# Patient Record
Sex: MALE | Race: BLACK OR AFRICAN AMERICAN | Employment: OTHER | URBAN - METROPOLITAN AREA
[De-identification: names, ages, dates, MRNs, and addresses within clinical notes are randomized per-mention and may not be internally consistent; named-entity substitution may affect disease eponyms.]

---

## 2018-10-29 ENCOUNTER — OFFICE VISIT (OUTPATIENT)
Dept: ORTHOPEDIC SURGERY | Age: 58
End: 2018-10-29

## 2018-10-29 VITALS
WEIGHT: 256.8 LBS | OXYGEN SATURATION: 98 % | DIASTOLIC BLOOD PRESSURE: 78 MMHG | TEMPERATURE: 98.2 F | HEART RATE: 74 BPM | RESPIRATION RATE: 16 BRPM | SYSTOLIC BLOOD PRESSURE: 125 MMHG | HEIGHT: 70 IN | BODY MASS INDEX: 36.76 KG/M2

## 2018-10-29 DIAGNOSIS — M76.61 ACHILLES TENDINITIS OF RIGHT LOWER EXTREMITY: Primary | ICD-10-CM

## 2018-10-29 PROBLEM — E66.01 SEVERE OBESITY (HCC): Status: ACTIVE | Noted: 2018-10-29

## 2018-10-29 RX ORDER — MELOXICAM 15 MG/1
15 TABLET ORAL
Qty: 30 TAB | Refills: 0 | Status: SHIPPED | OUTPATIENT
Start: 2018-10-29 | End: 2018-11-27

## 2018-10-29 RX ORDER — FAMOTIDINE 40 MG/1
40 TABLET, FILM COATED ORAL DAILY
Qty: 30 TAB | Refills: 0 | Status: SHIPPED | OUTPATIENT
Start: 2018-10-29 | End: 2021-10-07

## 2018-10-29 RX ORDER — DICLOFENAC SODIUM 75 MG/1
TABLET, DELAYED RELEASE ORAL
COMMUNITY
End: 2018-10-29

## 2018-10-29 NOTE — PROGRESS NOTES
AMBULATORY PROGRESS NOTE Patient: Laya Miller             MRN: [id number]     SSN: [id number] Body mass index is 36.85 kg/m². YOB: 1960     AGE: 59 y.o. EX: male PCP: No primary care provider on file. IMPRESSION/DIAGNOSIS AND TREATMENT PLAN  
 
DIAGNOSES 1. Achilles tendinitis of right lower extremity Orders Placed This Encounter  AMB SUPPLY ORDER  
 meloxicam (MOBIC) 15 mg tablet  famotidine (PEPCID) 40 mg tablet Laya Miller understands his diagnoses and the proposed plan. Plan: 
 
1) DME Order: Short right CAM walker boot with wedge. 2) ZACH Alves ATC, has educated the patient on Achilles tendon specific exercises and/or stretches. 3) Mobic 15 m PO every day; 30 tablets, 0 refills. 4) Pepcid 40 m PO every day; 30 tablets, 0 refills. RTO - 3 weeks HPI AND EXAMINATION Laya Miller IS A 59 y.o. male who presents to my outpatient office complaining of right foot pain. Mr. Elias Suazo reports that he has been experiencing pain in his right heel for 2 months. He reports that his pain does not improve when he does not work for given periods of time. He states that his pain is mostly present when he walks, and that he does not experience any pain when seated. He notes that he has to walk with his foot outwards in order to avoid pain. Mr. Elias Suazo notes that at times, his pain can reach a 10. He notes that he had xrays taken of his right foot at [facility] on 10/1. The patient denies any recent falls, twists, or trauma. He denies h/o fluoroquinolone exposure. The patient does note, however, that he works at [employer] and that he walks for prolonged periods of time. He states that he wears steel toed shoes. The patient denies any GI issues or bariatric procedures. The patient is not currently taking blood thinners.   
 
She had x-rays on a CD/DVD from [facility].  I did review these x-rays, two views of the right heel. These films show a very large, calcific bone spur at the insertion point of the Achilles tendon. Otherwise, I see no osteolytic or osteoblastic lesions on these two views. These x-rays were done on October 1, 2018, two views of the right heel, which I did review on a CD/DVD. Visit Vitals /78 Pulse 74 Temp 98.2 °F (36.8 °C) (Oral) Resp 16 Ht 5' 10\" (1.778 m) Wt 256 lb 12.8 oz (116.5 kg) SpO2 98% BMI 36.85 kg/m² Appearance: Alert, well appearing and pleasant patient who is in no distress, oriented to person, place/time, and who follows commands. This patient is accompanied in the examination room by his  self. no dementia Psychiatric: Affect and mood are appropriate. Patient arrives to office via: without assistive device:   
H EENT (2): Head normocephalic & atraumatic. Both pupils are round, non icteric sclera Eye: EOM are intact and sclera are clear Neck: ROM WNL Hearings Intact, does not require hearing aid device Respiratory: Breathing is unlabored without accessory chest muscle use Cardiovascular/Peripheral Vascular: Normal Pulses to each  foot ANKLE/FOOT right Psychiatry: Alert, Oriented x 3; Speech normal in context and clarity,  
         Memory intact grossly, no involuntary movements - tremors, no dementia Gait: Normal 
Tenderness: Mild tenderness at the insertional Achilles tendon. Cutaneous: WNL. Joint Motion: Full ROM. Joint / Tendon Stability: No Ankle or Subtalar instability or joint laxity. No peroneal sublux ability or dislocation Alignment: Forefoot, Midfoot, Hindfoot WNL. Contractures: No Gastrocnemius or Achilles contracture Neuro Motor/Sensory: NL/NL. Vascular: NL foot/ankle pulses. Lymphatics: No extremity lymphedema, No calf swelling, no tenderness to calf muscles. CHART REVIEW History reviewed. No pertinent past medical history. Current Outpatient Medications Medication Sig  
 meloxicam (MOBIC) 15 mg tablet Take 1 Tab by mouth daily (with breakfast).  famotidine (PEPCID) 40 mg tablet Take 1 Tab by mouth daily. No current facility-administered medications for this visit. No Known Allergies History reviewed. No pertinent surgical history. Social History Occupational History  Not on file Tobacco Use  Smoking status: Former Smoker Last attempt to quit: 10/29/1989 Years since quittin.0  Smokeless tobacco: Never Used Substance and Sexual Activity  Alcohol use: Not on file  Drug use: Not on file  Sexual activity: Not on file Family History Problem Relation Age of Onset  Hypertension Mother  Hypertension Father  Diabetes Father REVIEW OF SYSTEMS : 10/29/2018  ALL BELOW ARE Negative except : SEE HPI  
 
CONSTITUTIONAL: denies chills, fatigue, fever, weight change PSYCH: denies anxiety, depression, irritability or mood swings ENT: denies - headaches, hearing change, nasal congestion, oral lesions, or sore throat HEM/ONC denies - bleeding problems, bruising, pallor or swollen lymph nodes ENDO: denies hot flashes, polydipsia/polyuria or temperature intolerance RESP: denies - cough, shortness of breath or wheezing CV: denies - chest pain, edema or palpitations, MATTHEWS 
GI: denies - abdominal pain, change in bowel habits, constipation, diarrhea or nausea/vomiting : denies - dysuria, hematuria, incontinence, pelvic pain MSK: denies  - See HPI. NEURO: denies - confusion, headaches, seizures or weakness DERM: denies - dry skin, hair changes, rash or skin lesion changes VASCULAR: Peripheral Vascular: No calf pain, vascular vein tenderness to calf pain No calf throbbing, posterior knee throbbing pain DIAGNOSTIC IMAGING See above Please see above section of this report. I have personally reviewed the results of the above study.  The interpretation of this study is my professional opinion. Written by Kvng Barahona, as dictated by Dr. Branden Araujo. I, Dr. Branden Araujo, confirm that all documentation is accurate.

## 2018-10-29 NOTE — PATIENT INSTRUCTIONS
Please follow up in 3 weeks. You are advised to contact us if your condition worsens.    
 
 
Achilles Tendinitis: Exercises Your Care Instructions Here are stretching and strengthening exercises for your achilles tendon. Start each exercise slowly. Ease off the exercise if you start to have pain. Your doctor or  will tell you when you can start these exercises and which ones will work best for you. Toe stretch 1. Sit in a chair, and extend your affected leg so that your heel is on the floor. 2. With your hand, reach down and pull your big toe up and back. Pull toward your ankle and away from the floor. 3. Hold the position for at least 20 to 30 seconds. 4. Repeat 3 times a session, up to 5 sessions a day. Towel stretch (calf) 1. Sit with your legs extended and knees straight. 2. Place a towel around your foot just under the toes. 3. Hold each end of the towel in each hand, with your hands above your knees. 4. Pull back with the towel so that your foot stretches toward you. 5. Hold the position for at least 20 to 30 seconds. 6. Repeat 3 times a session, up to 5 sessions a day. Floor stretch (calf) 1. Stand about 2 feet from a wall, and place your hands on the wall at about shoulder height. Or you can stand behind a chair, placing your hands on the back of it for balance. 2. Step back with the leg you want to stretch. Keep the leg straight, and press your heel into the floor with your toe turned slightly in. 
3. Lean forward, and bend your other leg slightly. Feel the stretch in the Achilles tendon of your back leg. Hold for at least 15 to 30 seconds. Repeat with back knee slightly bent. 4. Repeat 3 times a session, up to 5 sessions a day. Stair stretch 1. Stand with the balls of both feet on the edge of a step or curb. With at least one hand, hold onto something solid for balance, such as a banister or handrail. 2. Keeping your affected leg straight, slowly let that heel hang down off of the step or curb until you feel a stretch in the back of your calf and/or Achilles area. Some of your weight should still be on the other leg. 3. Hold this position for at least 20 to 30 seconds. 4. Repeat 3 times a session, up to 5 times a day or whenever your Achilles tendon starts to feel tight. This stretch should also be repeated with your knee slightly bent. If too easy, progress to one leg at a time. Heel raises (eccentric emphasis) 1. Stand on a step with your heel off the edge of the step. Hold on to a handrail or wall for balance. 2. Push up on your toes, then slowly count to 10 as you lower yourself back down until your heel is below the step. If it hurts to push up on your toes, try putting most of your weight on your other foot as you push up, or try using your arms to help you. If you can't do this exercise without causing pain, stop the exercise and talk to your doctor. 3. Repeat the exercise 10 times. Repeat exercise with the knee slightly bent. If too easy, progress to one leg at a time. Ice Massage (pain relief) Fill small paper cup with water and freeze until needed. When frozen and ready for use, tear off top half inch of cup, exposing the ice. Place ice directly on your heel cord (or wherever you have pain). Move the ice in a circular motion for up to 5 minutes (no more). Use towels to catch the water drippings. You should feel 4 stages of sensations starting with. .. 1. Uncomfortable sensation of cold, then 2. Stinging, then 3. Burning or aching feeling, then 4. Numbness If the pain is too great to handle, lift it off your skin for a few seconds, dab with towel and then place it back on for a few circular motions and repeat.  
 
**Do not perform for more than 5 minutes or you may run the risk of frost bite and cause death to the tissue.

## 2018-11-27 ENCOUNTER — OFFICE VISIT (OUTPATIENT)
Dept: ORTHOPEDIC SURGERY | Age: 58
End: 2018-11-27

## 2018-11-27 VITALS
HEART RATE: 63 BPM | SYSTOLIC BLOOD PRESSURE: 128 MMHG | WEIGHT: 258.2 LBS | OXYGEN SATURATION: 96 % | RESPIRATION RATE: 16 BRPM | BODY MASS INDEX: 36.97 KG/M2 | HEIGHT: 70 IN | TEMPERATURE: 97.6 F | DIASTOLIC BLOOD PRESSURE: 86 MMHG

## 2018-11-27 DIAGNOSIS — M76.61 ACHILLES TENDINITIS OF RIGHT LOWER EXTREMITY: Primary | ICD-10-CM

## 2018-11-27 RX ORDER — ETODOLAC 400 MG/1
400 TABLET, FILM COATED ORAL 2 TIMES DAILY WITH MEALS
Qty: 60 TAB | Refills: 0 | Status: SHIPPED | OUTPATIENT
Start: 2018-11-27 | End: 2021-10-07

## 2018-11-27 RX ORDER — FAMOTIDINE 40 MG/1
40 TABLET, FILM COATED ORAL DAILY
Qty: 30 TAB | Refills: 0 | Status: SHIPPED | OUTPATIENT
Start: 2018-11-27 | End: 2021-10-07

## 2018-11-27 NOTE — PATIENT INSTRUCTIONS
Please follow up on December 26th. You are advised to contact us if your condition worsens. Achilles Tendon: Exercises Your Care Instructions Here are some examples of exercises for your achilles tendon. Start each exercise slowly. Ease off the exercise if you start to have pain. Your doctor or physical therapist will tell you when you can start these exercises and which ones will work best for you. Toe stretch Toe stretch 1. Sit in a chair, and extend your affected leg so that your heel is on the floor. 2. With your hand, reach down and pull your big toe up and back. Pull toward your ankle and away from the floor. 3. Hold the position for at least 15 to 30 seconds. 4. Repeat 2 to 4 times a session, several times a day. Calf-plantar fascia stretch 1. Sit with your legs extended and knees straight. 2. Place a towel around your foot just under the toes. 3. Hold each end of the towel in each hand, with your hands above your knees. 4. Pull back with the towel so that your foot stretches toward you. 5. Hold the position for at least 15 to 30 seconds. 6. Repeat 2 to 4 times a session, up to 5 sessions a day. Floor stretch 1. Stand about 2 feet from a wall, and place your hands on the wall at about shoulder height. Or you can stand behind a chair, placing your hands on the back of it for balance. 2. Step back with the leg you want to stretch. Keep the leg straight, and press your heel into the floor with your toe turned slightly in. 
3. Lean forward, and bend your other leg slightly. Feel the stretch in the Achilles tendon of your back leg. Hold for at least 15 to 30 seconds. 4. Repeat 2 to 4 times a session, up to 5 sessions a day. Stair stretch 1. Stand with the balls of both feet on the edge of a step or curb (or a medium-sized phone book). With at least one hand, hold onto something solid for balance, such as a banister or handrail. 2. Keeping your affected leg straight, slowly let that heel hang down off of the step or curb until you feel a stretch in the back of your calf and/or Achilles area. Some of your weight should still be on the other leg. 3. Hold this position for at least 15 to 30 seconds. 4. Repeat 2 to 4 times a session, up to 5 times a day or whenever your Achilles tendon starts to feel tight. This stretch can also be done with your knee slightly bent. Strength exercise 1. This exercise will get you started on building strength after an Achilles tendon injury. Your doctor or physical therapist can help you move on to more challenging exercises as you heal and get stronger. 2. Stand on a step with your heel off the edge of the step. Hold on to a handrail or wall for balance. 3. Push up on your toes, then slowly count to 10 as you lower yourself back down until your heel is below the step. If it hurts to push up on your toes, try putting most of your weight on your other foot as you push up, or try using your arms to help you. If you can't do this exercise without causing pain, stop the exercise and talk to your doctor. 4. Repeat the exercise 8 to 12 times, half with the knee straight and half with the knee bent. Follow-up care is a key part of your treatment and safety. Be sure to make and go to all appointments, and call your doctor if you are having problems. It's also a good idea to know your test results and keep a list of the medicines you take. Where can you learn more? Go to http://aby-josé luis.info/. Enter F259 in the search box to learn more about \"Achilles Tendon: Exercises. \" Current as of: November 29, 2017 Content Version: 11.8 © 7689-9845 [employer]. Care instructions adapted under license by [employer] (which disclaims liability or warranty for this information).  If you have questions about a medical condition or this instruction, always ask your healthcare professional. [place] [address] any warranty or liability for your use of this information.

## 2018-11-27 NOTE — PROGRESS NOTES
AMBULATORY PROGRESS NOTE Patient: Julianne Snowden             MRN: [id number]     SSN: [id number] Body mass index is 37.05 kg/m². YOB: 1960     AGE: 59 y.o. EX: male PCP: No primary care provider on file. IMPRESSION/DIAGNOSIS AND TREATMENT PLAN  
 
DIAGNOSES 1. Achilles tendinitis of right lower extremity Orders Placed This Encounter  REFERRAL TO PHYSICAL THERAPY  etodolac (LODINE) 400 mg tablet  famotidine (PEPCID) 40 mg tablet Julianne Snowden understands his diagnoses and the proposed plan. Plan: 
 
1) Lodine 400 m PO BID; 60 tablets, 0 refills. 2) Pepcid 40 m PO every day; 30 tablets, 0 refills. 3) Referral to Physical Therapy. Please use the Graston technique, gastrocnemius stretching, and Achilles tendon stretching. 4) Anticipate MRI if condition does not improve. RTO -  HPI AND EXAMINATION Julianne Snowden IS A 59 y.o. male who presents to my outpatient office for follow up of Achilles tendinitis of the RLE. At the last visit, I provided an order for a short right CAM walker boot with a wedge, prescribed Mobic 15 mg and Pepcid 40 mg, and provided an HEP with Achilles tendon specific exercises. Since we saw him last, Mr. Erasto Corcoran states that he is still experiencing daily pain in his right Achilles tendon. At its worse, he rates the pain at an 8. He is still working, where he has to wear working boots. The patient denies any GI issues or bariatric procedures. He has tried Voltaren, but notes that the pain relief does not last.  At this time, he will switch to Lodine 400 mg and will begin formal PT. Visit Vitals /86 Pulse 63 Temp 97.6 °F (36.4 °C) (Oral) Resp 16 Ht 5' 10\" (1.778 m) Wt 258 lb 3.2 oz (117.1 kg) SpO2 96% BMI 37.05 kg/m² Appearance: Alert, well appearing and pleasant patient who is in no distress, oriented to person, place/time, and who follows commands. This patient is accompanied in the examination room by his  self. no dementia Psychiatric: Affect and mood are appropriate. Patient arrives to office via: without assistive device:   
H EENT (2): Head normocephalic & atraumatic. Both pupils are round, non icteric sclera Eye: EOM are intact and sclera are clear Neck: ROM WNL Hearings Intact, does not require hearing aid device Respiratory: Breathing is unlabored without accessory chest muscle use Cardiovascular/Peripheral Vascular: Normal Pulses to each  foot ANKLE/FOOT right 
  
Psychiatry: Alert, Oriented x 3; Speech normal in context and clarity,  
                    Memory intact grossly, no involuntary movements - tremors, no dementia Gait: Normal 
Tenderness: Mild tenderness at the insertional Achilles tendon. Cutaneous: WNL. Joint Motion: Full ROM. Joint / Tendon Stability: No Ankle or Subtalar instability or joint laxity. No peroneal sublux ability or dislocation Alignment: Forefoot, Midfoot, Hindfoot WNL. Contractures: No Gastrocnemius or Achilles contracture Neuro Motor/Sensory: NL/NL. Vascular: NL foot/ankle pulses. Lymphatics: No extremity lymphedema, No calf swelling, no tenderness to calf muscles. CHART REVIEW No past medical history on file. Current Outpatient Medications Medication Sig  etodolac (LODINE) 400 mg tablet Take 400 mg by mouth two (2) times daily (with meals).  famotidine (PEPCID) 40 mg tablet Take 1 Tab by mouth daily.  famotidine (PEPCID) 40 mg tablet Take 1 Tab by mouth daily. No current facility-administered medications for this visit. No Known Allergies No past surgical history on file. Social History Occupational History  Not on file Tobacco Use  Smoking status: Former Smoker Last attempt to quit: 10/29/1989 Years since quittin.0  Smokeless tobacco: Never Used Substance and Sexual Activity  Alcohol use: Not on file  Drug use: Not on file  Sexual activity: Not on file Family History Problem Relation Age of Onset  Hypertension Mother  Hypertension Father  Diabetes Father REVIEW OF SYSTEMS : 11/27/2018  ALL BELOW ARE Negative except : SEE HPI  
 
CONSTITUTIONAL: denies chills, fatigue, fever, weight change PSYCH: denies anxiety, depression, irritability or mood swings ENT: denies - headaches, hearing change, nasal congestion, oral lesions, or sore throat HEM/ONC denies - bleeding problems, bruising, pallor or swollen lymph nodes ENDO: denies hot flashes, polydipsia/polyuria or temperature intolerance RESP: denies - cough, shortness of breath or wheezing CV: denies - chest pain, edema or palpitations, MATTHEWS 
GI: denies - abdominal pain, change in bowel habits, constipation, diarrhea or nausea/vomiting : denies - dysuria, hematuria, incontinence, pelvic pain MSK: denies  - See HPI. NEURO: denies - confusion, headaches, seizures or weakness DERM: denies - dry skin, hair changes, rash or skin lesion changes VASCULAR: Peripheral Vascular: No calf pain, vascular vein tenderness to calf pain No calf throbbing, posterior knee throbbing pain DIAGNOSTIC IMAGING No notes on file Please see above section of this report. I have personally reviewed the results of the above study. The interpretation of this study is my professional opinion. Written by Meño Lua, as dictated by Dr. Cortney Ortiz. I, Dr. Cortney Ortiz, confirm that all documentation is accurate.

## 2018-11-29 ENCOUNTER — TELEPHONE (OUTPATIENT)
Dept: ORTHOPEDIC SURGERY | Age: 58
End: 2018-11-29

## 2018-11-29 NOTE — TELEPHONE ENCOUNTER
. Tamera Bryant identified himself on his voice mail. I left a detailed message asking if he would like to attend physical therapy at [facility] or [facility]. Thank you.

## 2018-12-24 ENCOUNTER — OFFICE VISIT (OUTPATIENT)
Dept: ORTHOPEDIC SURGERY | Age: 58
End: 2018-12-24

## 2018-12-24 VITALS
HEART RATE: 68 BPM | WEIGHT: 259 LBS | SYSTOLIC BLOOD PRESSURE: 143 MMHG | TEMPERATURE: 95.5 F | DIASTOLIC BLOOD PRESSURE: 86 MMHG | BODY MASS INDEX: 37.08 KG/M2 | HEIGHT: 70 IN | OXYGEN SATURATION: 97 % | RESPIRATION RATE: 16 BRPM

## 2018-12-24 DIAGNOSIS — M76.61 ACHILLES TENDINITIS OF RIGHT LOWER EXTREMITY: Primary | ICD-10-CM

## 2018-12-24 RX ORDER — ETODOLAC 400 MG/1
400 TABLET, FILM COATED ORAL 2 TIMES DAILY WITH MEALS
Qty: 60 TAB | Refills: 1 | Status: SHIPPED | OUTPATIENT
Start: 2018-12-24 | End: 2021-10-07

## 2018-12-24 RX ORDER — FAMOTIDINE 40 MG/1
40 TABLET, FILM COATED ORAL DAILY
Qty: 30 TAB | Refills: 1 | Status: SHIPPED | OUTPATIENT
Start: 2018-12-24 | End: 2021-10-07

## 2018-12-24 NOTE — PATIENT INSTRUCTIONS
Please follow up in 6 weeks. You are advised to contact us if your condition worsens. Achilles Tendon: Exercises  Your Care Instructions  Here are some examples of exercises for your achilles tendon. Start each exercise slowly. Ease off the exercise if you start to have pain. Your doctor or physical therapist will tell you when you can start these exercises and which ones will work best for you. Toe stretch  Toe stretch    1. Sit in a chair, and extend your affected leg so that your heel is on the floor. 2. With your hand, reach down and pull your big toe up and back. Pull toward your ankle and away from the floor. 3. Hold the position for at least 15 to 30 seconds. 4. Repeat 2 to 4 times a session, several times a day. Calf-plantar fascia stretch    1. Sit with your legs extended and knees straight. 2. Place a towel around your foot just under the toes. 3. Hold each end of the towel in each hand, with your hands above your knees. 4. Pull back with the towel so that your foot stretches toward you. 5. Hold the position for at least 15 to 30 seconds. 6. Repeat 2 to 4 times a session, up to 5 sessions a day. Floor stretch    1. Stand about 2 feet from a wall, and place your hands on the wall at about shoulder height. Or you can stand behind a chair, placing your hands on the back of it for balance. 2. Step back with the leg you want to stretch. Keep the leg straight, and press your heel into the floor with your toe turned slightly in.  3. Lean forward, and bend your other leg slightly. Feel the stretch in the Achilles tendon of your back leg. Hold for at least 15 to 30 seconds. 4. Repeat 2 to 4 times a session, up to 5 sessions a day. Stair stretch    1. Stand with the balls of both feet on the edge of a step or curb (or a medium-sized phone book). With at least one hand, hold onto something solid for balance, such as a banister or handrail.   2. Keeping your affected leg straight, slowly let that heel hang down off of the step or curb until you feel a stretch in the back of your calf and/or Achilles area. Some of your weight should still be on the other leg. 3. Hold this position for at least 15 to 30 seconds. 4. Repeat 2 to 4 times a session, up to 5 times a day or whenever your Achilles tendon starts to feel tight. This stretch can also be done with your knee slightly bent. Strength exercise    1. This exercise will get you started on building strength after an Achilles tendon injury. Your doctor or physical therapist can help you move on to more challenging exercises as you heal and get stronger. 2. Stand on a step with your heel off the edge of the step. Hold on to a handrail or wall for balance. 3. Push up on your toes, then slowly count to 10 as you lower yourself back down until your heel is below the step. If it hurts to push up on your toes, try putting most of your weight on your other foot as you push up, or try using your arms to help you. If you can't do this exercise without causing pain, stop the exercise and talk to your doctor. 4. Repeat the exercise 8 to 12 times, half with the knee straight and half with the knee bent. Follow-up care is a key part of your treatment and safety. Be sure to make and go to all appointments, and call your doctor if you are having problems. It's also a good idea to know your test results and keep a list of the medicines you take. Where can you learn more? Go to http://aby-josé luis.info/. Enter B532 in the search box to learn more about \"Achilles Tendon: Exercises. \"  Current as of: November 29, 2017  Content Version: 11.8  © 7041-8054 [employer]. Care instructions adapted under license by [employer] (which disclaims liability or warranty for this information).  If you have questions about a medical condition or this instruction, always ask your healthcare professional. Lyssacarina Cooper disclaims any warranty or liability for your use of this information.

## 2018-12-24 NOTE — PROGRESS NOTES
AMBULATORY PROGRESS NOTE      Patient: Ariel Jones             MRN: [id number]     SSN: [id number] Body mass index is 37.16 kg/m². YOB: 1960     AGE: 59 y.o. EX: male    PCP: No primary care provider on file. IMPRESSION/DIAGNOSIS AND TREATMENT PLAN     DIAGNOSES  1. Achilles tendinitis of right lower extremity        Orders Placed This Encounter    etodolac (LODINE) 400 mg tablet    famotidine (PEPCID) 40 mg tablet      Ariel Jones understands his diagnoses and the proposed plan. Plan:    1) Renew Lodine 400 m PO BID; 60 tablets, 0 refills. 2) Renew Pepcid 40 m PO every day; 30 tablets, 0 refills. 3) Continue HEP with Achilles tendon exercises and stretches. 4) Continue activity as tolerated. RTO - 6 weeks     HPI AND EXAMINATION     Ariel Jones IS A 59 y.o. male who presents to my outpatient office for follow up of Achilles tendinitis of the RLE. At the last visit, I prescribed Lodine 400 mg and Pepcid 40 mg, provided a referral to PT, and instructed the patient to continue activity as tolerated. Since we saw him last, Mr. Barbara Berumen states that he is still experiencing daily 7 out of 10 pain in his right Achilles tendon. He states that he has been taking the Lodine as directed, and notes that it has been helping with his pain. He denies any GI issues with this medication. He notes that his ROM has improved, as well. Visit Vitals  /86   Pulse 68   Temp 95.5 °F (35.3 °C) (Oral)   Resp 16   Ht 5' 10\" (1.778 m)   Wt 259 lb (117.5 kg)   SpO2 97%   BMI 37.16 kg/m²      Appearance: Alert, well appearing and pleasant patient who is in no distress, oriented to person, place/time, and who follows commands. This patient is accompanied in the examination room by his  self. no dementia  Psychiatric: Affect and mood are appropriate. Patient arrives to office via: without assistive device:    H EENT (2): Head normocephalic & atraumatic.                         YGSU pupils are round, non icteric sclera              Eye: EOM are intact and sclera are clear               Neck: ROM WNL                Hearings Intact, does not require hearing aid device  Respiratory: Breathing is unlabored without accessory chest muscle use  Cardiovascular/Peripheral Vascular: Normal Pulses to each  foot     ANKLE/FOOT right     Psychiatry: Alert, Oriented x 3; Speech normal in context and clarity,                       Memory intact grossly, no involuntary movements - tremors, no dementia  Gait: Normal  Tenderness: Mild tenderness at the insertional Achilles tendon.   Cutaneous: WNL. Joint Motion: Full ROM. Joint / Tendon Stability: No Ankle or Subtalar instability or joint laxity.                                           No peroneal sublux ability or dislocation  Alignment: Forefoot, Midfoot, Hindfoot WNL.    Contractures: No Gastrocnemius or Achilles contracture  Neuro Motor/Sensory: NL/NL. Vascular: NL foot/ankle pulses. Lymphatics: No extremity lymphedema, No calf swelling, no tenderness to calf muscles. CHART REVIEW     No past medical history on file. Current Outpatient Medications   Medication Sig    etodolac (LODINE) 400 mg tablet Take 400 mg by mouth two (2) times daily (with meals).  famotidine (PEPCID) 40 mg tablet Take 1 Tab by mouth daily.  etodolac (LODINE) 400 mg tablet Take 400 mg by mouth two (2) times daily (with meals).  famotidine (PEPCID) 40 mg tablet Take 1 Tab by mouth daily.  famotidine (PEPCID) 40 mg tablet Take 1 Tab by mouth daily. No current facility-administered medications for this visit. No Known Allergies  No past surgical history on file.   Social History     Occupational History    Not on file   Tobacco Use    Smoking status: Former Smoker     Last attempt to quit: 10/29/1989     Years since quittin.1    Smokeless tobacco: Never Used   Substance and Sexual Activity    Alcohol use: Not on file  Drug use: Not on file    Sexual activity: Not on file     Family History   Problem Relation Age of Onset    Hypertension Mother     Hypertension Father     Diabetes Father         REVIEW OF SYSTEMS : 12/24/2018  ALL BELOW ARE Negative except : SEE HPI     Constitutional: Negative for fever, chills and weight loss. Neg Weight Loss  Cardiovascular: Negative for chest pain, claudication and leg swelling. SOB, MATTHEWS   Gastrointestinal/Urological: Negative for  pain, N/V/D/C, Blood in stool or urine,dysuria                         Hematuria, Incontinence, pelvic pain  Musculoskeletal: see HPI. Neurological: Negative for dizziness and weakness, headaches,Visual Changes             Confusion,  Or Seizures,   Psychiatric/Behavioral: Negative for depression, memory loss and substance abuse. Extremities:  Negative for hair changes, rash or skin lesion changes. Hematologic: Negative for Bleeding problems, bruising, pallor or swollen lymph nodes. Peripheral Vascular: No calf pain, vascular vein tenderness to calf pain              No calf throbbing, posterior knee throbbing pain     DIAGNOSTIC IMAGING     No notes on file    Please see above section of this report. I have personally reviewed the results of the above study. The interpretation of this study is my professional opinion. Written by Todd Edmond, as dictated by Dr. Edvin Mesa. I, Dr. Edvin Mesa, confirm that all documentation is accurate.

## 2021-10-07 ENCOUNTER — OFFICE VISIT (OUTPATIENT)
Dept: INTERNAL MEDICINE CLINIC | Age: 61
End: 2021-10-07
Payer: COMMERCIAL

## 2021-10-07 VITALS
HEIGHT: 70 IN | RESPIRATION RATE: 20 BRPM | OXYGEN SATURATION: 98 % | WEIGHT: 248.2 LBS | SYSTOLIC BLOOD PRESSURE: 135 MMHG | DIASTOLIC BLOOD PRESSURE: 76 MMHG | TEMPERATURE: 97.9 F | HEART RATE: 69 BPM | BODY MASS INDEX: 35.53 KG/M2

## 2021-10-07 DIAGNOSIS — Z00.00 ANNUAL PHYSICAL EXAM: ICD-10-CM

## 2021-10-07 DIAGNOSIS — E66.01 SEVERE OBESITY (HCC): ICD-10-CM

## 2021-10-07 DIAGNOSIS — Z00.00 ENCOUNTER FOR SCREENING AND PREVENTATIVE CARE: Primary | ICD-10-CM

## 2021-10-07 DIAGNOSIS — R68.82 DECREASED LIBIDO: ICD-10-CM

## 2021-10-07 PROCEDURE — 99386 PREV VISIT NEW AGE 40-64: CPT | Performed by: INTERNAL MEDICINE

## 2021-10-07 PROCEDURE — 99203 OFFICE O/P NEW LOW 30 MIN: CPT | Performed by: INTERNAL MEDICINE

## 2021-10-07 NOTE — PROGRESS NOTES
*ATTENTION:  This note has been created by a medical student for educational purposes only. Please do not refer to the content of this note for clinical decision-making, billing, or other purposes. Please see attending physicians note to obtain clinical information on this patient. *       1. Encounter for screening and preventative care  - REFERRAL TO GASTROENTEROLOGY for routine colonoscopy  - Patient plans to get flu vaccine at pharmacy, advised him to get shingles and tetanus vaccine as well  - Advised patient to see a dentist    2. Annual physical exam  Performed PE  - METABOLIC PANEL, COMPREHENSIVE  - LIPID PANEL  - TSH 3RD GENERATION  - PSA SCREENING (SCREENING)  - HEMOGLOBIN A1C WITH EAG    3. Decreased libido  Not associated with any other symptoms of low testosterone or conflict with spouse  - TESTOSTERONE, FREE & TOTAL  - Recommended behavioral changes    4. Severe obesity ([facility] [place] [address]  Patient eats calorie dense food, 2 meals a day with snacks, but avoids sugar-containing beverages. Provided dietary counseling, including less calorie dense fruits and more veggies. He checks his weight every day      Chief Complaint   Patient presents with   [facility] Patient        HPI   Mr. Michel Mckenzie is a 56year old male who comes to clinic at the request of his wife for routine health screening. He incidentally reports decreased libido, mild right-hand pain that is relieved with Advil, and rare intermittent self-limited lightheadedness that does not interfere with ADLs. He is also concerned about his diet. Patient Active Problem List   Diagnosis Code    Severe obesity ([facility] [place] [address] E66.01        No current outpatient medications on file prior to visit. No current facility-administered medications on file prior to visit.         ROS  - GEN: No chills  - HEENT: Does not see dentist  - CV: No chest pain  - RESP: No SOB  - ABD: No belly pain  - : decreased libido  - Neuro: very rare episodes of self-limited lightheadedness  - MS: mild intermittent right hand pain      Visit Vitals  /76   Pulse 69   Temp 97.9 °F (36.6 °C)   Resp 20   Ht 5' 10\" (1.778 m)   Wt 248 lb 3.2 oz (112.6 kg)   SpO2 98%   BMI 35.61 kg/m²        Physical Exam   Physical Exam  Constitutional:       Appearance: Well-appearing, no acute distress  HENT:      Head: Normocephalic. Nose: No gross deformity     Mouth: Mucous membranes are moist, dental carries  Eyes:      Extraocular Movements: Extraocular movements intact. Conjunctiva/sclera: anicteric     Pupils: Pupils are equal, round, and reactive to light. Cardiovascular:      Rate and Rhythm: regular rate and rhythm, no m/r/g     Pulses: 2+ radial pulses  Pulmonary:      Effort: No increased work of breathing     Breath sounds: CTAB  Abdominal:      General: NT, ND  Neurological:      Mental Status: patient is alert and grossly oriented.      No resting tremor, dysarthria, gait abnormality  Musculoskeletal:     Grossly full ROM in extremities  Skin:     Warm and dry  :     Deferred  Psych:     Appropriate mood and affect

## 2021-10-07 NOTE — PROGRESS NOTES
1. Encounter for screening and preventative care  Referral to GI for screening colonoscopy  - REFERRAL TO GASTROENTEROLOGY    2. Annual physical exam  Annual physical exam was performed  - METABOLIC PANEL, COMPREHENSIVE  - LIPID PANEL  - TSH 3RD GENERATION  - PSA SCREENING (SCREENING)  - HEMOGLOBIN A1C WITH EAG    3. Decreased libido  Patient's decreased libido could be caused from deficient testosterone. This is a new problem. I am to check a total and free testosterone level  - TESTOSTERONE, FREE & TOTAL    4. Severe obesity ([address]  Is a new problem. The patient and I had a very good conversations about weight loss. We have set a goal of at least 20 pounds over the next 3 months. I am also checking his TSH given that an underactive thyroid could be causing this      Chief Complaint   Patient presents with    New Patient        HPI   This is a delightful 56-year-old gentleman who presents as a new patient and for his annual physical.  He reports overall he has been doing well although he admits his weight is been a problem. He has been using his exercise machine at home and has actually lost a little bit of weight ever since he retired. He has noticed decreased libido. He denies any fatigue weakness or daytime somnolence. He has been having any fevers or chills and otherwise reports he is doing great. He has never had a colonoscopy. Patient Active Problem List   Diagnosis Code    Severe obesity ([facility] [address] E66.01        No current outpatient medications on file prior to visit. No current facility-administered medications on file prior to visit. ROS  - GEN: + weight loss, no fevers or chills + decreased libido  - HEENT: no vision changes, no tinnitus, no sore throat  - CV: no cp, palpitations or edema  - RESP: no sob, cough  - ABD: no n/v/d, no blood in stool  - : no dysuria or changes in freq.   - SKIN: no rashes, ulcers  - Neuro: no resting tremors, parasthesia in extremities, no headaches  - MS: No weakness in extremities, no gait abnormalities  - Psych: negative for depression or anxiety      Visit Vitals  /76   Pulse 69   Temp 97.9 °F (36.6 °C)   Resp 20   Ht 5' 10\" (1.778 m)   Wt 248 lb 3.2 oz (112.6 kg)   SpO2 98%   BMI 35.61 kg/m²           Physical Exam  Constitutional:       Appearance: Normal appearance. Morbidly obese. NAD and pleasant  HENT:      Head: Normocephalic. Nose: Nose normal.      Mouth/Throat:      Mouth: Mucous membranes are moist. Throat not inflammed  Eyes:      Extraocular Movements: Extraocular movements intact. Conjunctiva/sclera: Conjunctivae normal. Sclera anicteric     Pupils: Pupils are equal, round, and reactive to light. Cardiovascular:      Rate and Rhythm: Normal rate and regular rhythm. Pulses: Normal pulses. Pulmonary:      Effort: No respiratory distress. Breath sounds: CTAB and No stridor. No rhonchi. Abdominal:      General: There is no distension. NT, ND  Neurological:      Mental Status: patient is alert and oriented times 3.  No resting tremor, normal gait     Cranial Nerves: cranial nerves grossly intact  Muskuloskeletal     Full ROM in extremities     Normal gait  Skin     Dry without lesions on examined areas, warm to the touch       Deferred  Psychiatry     Calm, normal affect, interacting normally

## 2021-10-07 NOTE — PROGRESS NOTES
Amelia Randhawa presents today for   Chief Complaint   Patient presents with    New Patient       Is someone accompanying this pt? no  Is the patient using any DME equipment during OV? no    Depression Screening:  3 most recent PHQ Screens 10/7/2021   Little interest or pleasure in doing things Not at all   Feeling down, depressed, irritable, or hopeless Not at all   Total Score PHQ 2 0       Learning Assessment:  No flowsheet data found. Fall Risk  No flowsheet data found. ADL  ADL Assessment 10/7/2021   Feeding yourself No Help Needed   Getting from bed to chair No Help Needed   Getting dressed No Help Needed   Bathing or showering No Help Needed   Walk across the room (includes cane/walker) No Help Needed   Using the telphone No Help Needed   Taking your medications No Help Needed   Preparing meals No Help Needed   Managing money (expenses/bills) No Help Needed   Moderately strenuous housework (laundry) No Help Needed   Shopping for personal items (toiletries/medicines) No Help Needed   Shopping for groceries No Help Needed   Driving No Help Needed   Climbing a flight of stairs No Help Needed   Getting to places beyond walking distances No Help Needed       Health Maintenance reviewed and discussed and ordered per Provider. Health Maintenance Due   Topic Date Due    Hepatitis C Screening  Never done    COVID-19 Vaccine (1) Never done    DTaP/Tdap/Td series (1 - Tdap) Never done    Lipid Screen  Never done    Colorectal Cancer Screening Combo  Never done    Shingrix Vaccine Age 50> (1 of 2) Never done    Flu Vaccine (1) Never done   . Coordination of Care:  1. \"Have you been to the ER, urgent care clinic since your last visit? Hospitalized since your last visit? \" No    2. \"Have you seen or consulted any other health care providers outside of the [address] [place] [address] since your last visit? \" No     3. For patients aged 39-70: Has the patient had a colonoscopy?  No NEVER HAD A COLONOSCOPY

## 2021-10-08 ENCOUNTER — HOSPITAL ENCOUNTER (OUTPATIENT)
Dept: LAB | Age: 61
Discharge: HOME | End: 2021-10-08
Payer: COMMERCIAL

## 2021-10-08 LAB
ALBUMIN SERPL-MCNC: 4 G/DL (ref 3.5–4.7)
ALBUMIN/GLOB SERPL: 1.2 {RATIO}
ALP SERPL-CCNC: 55 U/L (ref 38–126)
ALT SERPL-CCNC: 18 U/L (ref 3–72)
ANION GAP SERPL CALC-SCNC: 9 MMOL/L
AST SERPL W P-5'-P-CCNC: 16 U/L (ref 17–74)
BILIRUB SERPL-MCNC: 0.8 MG/DL (ref 0.2–1)
BUN SERPL-MCNC: 11 MG/DL (ref 9–21)
BUN/CREAT SERPL: 12
CA-I BLD-MCNC: 9.1 MG/DL (ref 8.5–10.5)
CHLORIDE SERPL-SCNC: 107 MMOL/L (ref 94–111)
CHOLEST SERPL-MCNC: 209 MG/DL
CO2 SERPL-SCNC: 25 MMOL/L (ref 21–33)
CREAT SERPL-MCNC: 0.9 MG/DL (ref 0.8–1.5)
EST. AVERAGE GLUCOSE BLD GHB EST-MCNC: 114 MG/DL
GLOBULIN SER CALC-MCNC: 3.4 G/DL
GLUCOSE SERPL-MCNC: 92 MG/DL (ref 70–110)
HBA1C MFR BLD: 5.6 % (ref 4.2–5.6)
HDLC SERPL-MCNC: 67 MG/DL (ref 40–60)
HDLC SERPL: 3.1 {RATIO} (ref 0–5)
LDLC SERPL CALC-MCNC: 131.8 MG/DL (ref 0–100)
LIPID PROFILE,FLP: ABNORMAL
POTASSIUM SERPL-SCNC: 3.9 MMOL/L (ref 3.2–5.1)
PROT SERPL-MCNC: 7.4 G/DL (ref 6.1–8.4)
PSA SERPL-MCNC: 7.4 NG/ML (ref 0–4)
SODIUM SERPL-SCNC: 141 MMOL/L (ref 135–145)
TRIGL SERPL-MCNC: 51 MG/DL (ref ?–150)
TSH SERPL DL<=0.05 MIU/L-ACNC: 0.61 UIU/ML (ref 0.35–6.2)
VLDLC SERPL CALC-MCNC: 10.2 MG/DL

## 2021-10-08 PROCEDURE — 80061 LIPID PANEL: CPT

## 2021-10-08 PROCEDURE — 80053 COMPREHEN METABOLIC PANEL: CPT

## 2021-10-08 PROCEDURE — 36415 COLL VENOUS BLD VENIPUNCTURE: CPT

## 2021-10-08 PROCEDURE — 84153 ASSAY OF PSA TOTAL: CPT

## 2021-10-08 PROCEDURE — 84443 ASSAY THYROID STIM HORMONE: CPT

## 2021-10-08 PROCEDURE — 83036 HEMOGLOBIN GLYCOSYLATED A1C: CPT

## 2021-10-08 PROCEDURE — 84403 ASSAY OF TOTAL TESTOSTERONE: CPT

## 2021-10-11 ENCOUNTER — TELEPHONE (OUTPATIENT)
Dept: INTERNAL MEDICINE CLINIC | Age: 61
End: 2021-10-11

## 2021-10-11 DIAGNOSIS — R97.20 ELEVATED PSA: Primary | ICD-10-CM

## 2021-10-11 NOTE — TELEPHONE ENCOUNTER
----- Message from Hunter Pepe MD sent at 10/9/2021  2:57 PM EDT -----  His psa is significantly elevated. This doesn't mean he has prostate cancer but does warrant a urology followup. Please place a consult for urology.  Dr. Nneka Hendricks

## 2021-10-14 LAB
COMMENT, TESC2: NORMAL
TESTOST FREE SERPL-MCNC: 7.3 PG/ML (ref 6.6–18.1)
TESTOST SERPL-MCNC: 387 NG/DL (ref 264–916)

## 2021-12-20 ENCOUNTER — TELEPHONE (OUTPATIENT)
Dept: INTERNAL MEDICINE CLINIC | Age: 61
End: 2021-12-20

## 2021-12-20 NOTE — TELEPHONE ENCOUNTER
Pt called for his last PSA numbers/results. He is going to a urology appt in [place] and wants to be able to give to them.  His # is [phone]

## 2022-03-19 PROBLEM — E66.01 SEVERE OBESITY (HCC): Status: ACTIVE | Noted: 2018-10-29

## 2022-08-02 ENCOUNTER — HOSPITAL ENCOUNTER (OUTPATIENT)
Dept: LAB | Age: 62
Discharge: HOME | End: 2022-08-02
Payer: COMMERCIAL

## 2022-08-02 LAB — PSA SERPL-MCNC: 6.4 NG/ML (ref 0–4)

## 2022-08-02 PROCEDURE — 84153 ASSAY OF PSA TOTAL: CPT

## 2022-08-02 PROCEDURE — 36415 COLL VENOUS BLD VENIPUNCTURE: CPT

## 2022-08-17 ENCOUNTER — TELEPHONE (OUTPATIENT)
Dept: FAMILY MEDICINE CLINIC | Age: 62
End: 2022-08-17

## 2022-08-17 NOTE — TELEPHONE ENCOUNTER
Received message from [facility] ([facility]) that pt would like to schedule NP appt. Called pt and received VM. Left message to call me back.

## 2022-11-01 ENCOUNTER — HOSPITAL ENCOUNTER (OUTPATIENT)
Age: 62
Discharge: HOME | End: 2022-11-01
Attending: NURSE PRACTITIONER
Payer: COMMERCIAL

## 2022-11-01 VITALS — BODY MASS INDEX: 33 KG/M2 | WEIGHT: 230 LBS

## 2022-11-01 DIAGNOSIS — C61 PROSTATE CANCER (HCC): ICD-10-CM

## 2022-11-01 LAB — CREAT UR-MCNC: 1 MG/DL (ref 0.6–1.3)

## 2022-11-01 PROCEDURE — A9577 INJ MULTIHANCE: HCPCS | Performed by: NURSE PRACTITIONER

## 2022-11-01 PROCEDURE — 74011250636 HC RX REV CODE- 250/636: Performed by: NURSE PRACTITIONER

## 2022-11-01 PROCEDURE — 82565 ASSAY OF CREATININE: CPT

## 2022-11-01 PROCEDURE — 72197 MRI PELVIS W/O & W/DYE: CPT

## 2022-11-01 RX ADMIN — GADOBENATE DIMEGLUMINE 20 ML: 529 INJECTION, SOLUTION INTRAVENOUS at 15:58

## 2023-05-08 ENCOUNTER — HOSPITAL ENCOUNTER (OUTPATIENT)
Age: 63
Discharge: HOME | End: 2023-05-11
Payer: COMMERCIAL

## 2023-05-08 DIAGNOSIS — C61 MALIGNANT NEOPLASM OF PROSTATE (HCC): ICD-10-CM

## 2023-05-08 LAB — PSA SERPL-MCNC: 6.4 NG/ML (ref 0–4)

## 2023-05-08 PROCEDURE — 36415 COLL VENOUS BLD VENIPUNCTURE: CPT

## 2023-05-08 PROCEDURE — 84153 ASSAY OF PSA TOTAL: CPT

## 2023-09-08 ENCOUNTER — TELEPHONE (OUTPATIENT)
Facility: CLINIC | Age: 63
End: 2023-09-08

## 2023-09-08 NOTE — TELEPHONE ENCOUNTER
----- Message from Brentshahid Wilson sent at 9/8/2023  9:28 AM EDT -----  Subject: Appointment Request    Reason for Call: New Patient/New to Provider Appointment needed: New   Patient Request Appointment    QUESTIONS    Reason for appointment request? No appointments available during search     Additional Information for Provider? Pt would like a NP get established   appt with any doctor in this office. Pt would like to get a annual   physical during the first appt if possible. Pt will also want to discuss a   colonoscopy as a preventative procedure. Pt prefers to schedule anytime   after 9/28/23.  Please advise if any doctor can make availability and when.  ---------------------------------------------------------------------------  --------------  Cody [place] Maile  [phone]; OK to leave message on voicemail  ---------------------------------------------------------------------------  --------------  SCRIPT ANSWERS

## 2023-09-08 NOTE — TELEPHONE ENCOUNTER
----- Message from Joannelvira Brynn sent at 9/8/2023  9:30 AM EDT -----  Subject: Appointment Request    Reason for Call: New Patient/New to Provider Appointment needed: New   Patient Request Appointment    QUESTIONS    Reason for appointment request? No appointments available during search     Additional Information for Provider? Pt would like a NP get established   appt with any doctor in this office. Pt would like to get a annual   physical during the first appt if possible. Pt will also want to discuss a   colonoscopy as a preventative procedure. Pt prefers to schedule anytime   after 9/28/23.  Please advise if any doctor can make availability and when.  ---------------------------------------------------------------------------  --------------  [address]  [phone]; OK to leave message on voicemail  ---------------------------------------------------------------------------  --------------  SCRIPT ANSWERS

## 2023-09-08 NOTE — TELEPHONE ENCOUNTER
Spoke w/pt and have him scheduled for next available and advised physical will have to be scheduled as a separate visit after est care visit. He understood.

## 2023-09-14 ENCOUNTER — TELEPHONE (OUTPATIENT)
Facility: CLINIC | Age: 63
End: 2023-09-14

## 2023-09-14 NOTE — TELEPHONE ENCOUNTER
----- Message from Joannelvira Brynn sent at 9/8/2023  9:27 AM EDT -----  Subject: Appointment Request    Reason for Call: New Patient/New to Provider Appointment needed: New   Patient Request Appointment    QUESTIONS    Reason for appointment request? No appointments available during search     Additional Information for Provider? Pt would like a NP get established   appt with any doctor in this office. Pt would like to get a annual   physical during the first appt if possible. Pt will also want to discuss a   colonoscopy as a preventative procedure. Pt prefers to schedule anytime   after 9/28/23.  Please advise if any doctor can make availability and when.  ---------------------------------------------------------------------------  --------------  Cody [place] Maile  [phone]; OK to leave message on voicemail  ---------------------------------------------------------------------------  --------------  SCRIPT ANSWERS

## 2023-09-15 ENCOUNTER — TELEPHONE (OUTPATIENT)
Facility: CLINIC | Age: 63
End: 2023-09-15

## 2023-09-15 NOTE — TELEPHONE ENCOUNTER
----- Message from Ran Valencia sent at 9/15/2023  4:11 PM EDT -----  Subject: Message to Provider    QUESTIONS  Information for Provider? Patient was returning a missed call, please call   him back. ---------------------------------------------------------------------------  --------------  Kirstin GALE  [phone]; OK to leave message on voicemail  ---------------------------------------------------------------------------  --------------  SCRIPT ANSWERS  Relationship to Patient?  Self

## 2023-09-15 NOTE — TELEPHONE ENCOUNTER
Pt returning call for NP Sabina Trinidad, it looks like nursing tried calling him yesterday.     ~PCR

## 2023-11-16 NOTE — PROGRESS NOTES
Peg prep given to the patient via telephone, emailed, and mailed. Procedure 11-, Dx Code z12.11  Patient verbalized understanding.  bossman Pineda

## 2023-11-21 ENCOUNTER — TELEPHONE (OUTPATIENT)
Age: 63
End: 2023-11-21

## 2023-11-21 ENCOUNTER — SCHEDULED TELEPHONE ENCOUNTER (OUTPATIENT)
Age: 63
End: 2023-11-21

## 2023-11-21 DIAGNOSIS — Z12.11 SCREENING FOR COLON CANCER: Primary | ICD-10-CM

## 2023-11-21 RX ORDER — POLYETHYLENE GLYCOL 3350, SODIUM SULFATE ANHYDROUS, SODIUM BICARBONATE, SODIUM CHLORIDE, POTASSIUM CHLORIDE 236; 22.74; 6.74; 5.86; 2.97 G/4L; G/4L; G/4L; G/4L; G/4L
4 POWDER, FOR SOLUTION ORAL ONCE
Qty: 4000 ML | Refills: 0 | Status: SHIPPED | OUTPATIENT
Start: 2023-11-21 | End: 2023-11-21

## 2023-11-22 ENCOUNTER — PREP FOR PROCEDURE (OUTPATIENT)
Age: 63
End: 2023-11-22

## 2023-11-22 DIAGNOSIS — Z12.11 COLON CANCER SCREENING: Primary | ICD-10-CM

## 2023-11-22 RX ORDER — SODIUM CHLORIDE, SODIUM LACTATE, POTASSIUM CHLORIDE, CALCIUM CHLORIDE 600; 310; 30; 20 MG/100ML; MG/100ML; MG/100ML; MG/100ML
INJECTION, SOLUTION INTRAVENOUS CONTINUOUS
Status: CANCELLED | OUTPATIENT
Start: 2023-11-22

## 2023-11-22 NOTE — H&P
Open access updated H&P. Brief history: The patient is male [facility] and the [place] /  70 y.o. who was referred for screening colonoscopy. Review of the records showed that they had few if any health problems, excessive obesity, or significant medications that would require office evaluation prior to colonoscopy for colon cancer screening. After review of their chart, contact was made with the patient in discussion and literature sent regarding the procedure and the bowel preparation. They are here now for their procedure. Past medical and surgical history: History reviewed. No pertinent past medical history. Past Surgical History:   Procedure Laterality Date    PROSTATE BIOPSY          Allergies:  No Known Allergies     Medications:  No current facility-administered medications for this encounter. No current outpatient medications on file. Family history:  The patient has a family history of no known GI disease. Social history :    Social Connections: Not on file        ROS:   Review of Systems - negative     Vital signs:  [unfilled]     PE: Healthy appearing male  HEENT: Normocephalic atraumatic. No oral abnormalities. Lungs: Clear to auscultation percussion. Heart: Regular rate and rhythm without murmur rub or gallop. Abdomen: Normal bowel sounds, soft nontender without hepatosplenomegaly or masses. Extremities: No peripheral edema. Neuro: No distinct abnormalities. Impression:  1. Colon cancer screening. The patient is a healthy male that is stable and here for colon cancer screening via colonoscopy. Recommendations:  1. Colonoscopy with MAC. The risks, benefits, adverse reactions including death and the possibility of missed lesions were neoplasia were discussed in detail today with the patient prior to the procedure. They understand and agreed to undergo the procedure. 2.  Further recommendations pending their clinical course. 3.  Followup as needed.

## 2023-11-27 ENCOUNTER — ANESTHESIA EVENT (OUTPATIENT)
Age: 63
End: 2023-11-27
Payer: MEDICARE

## 2023-11-28 ENCOUNTER — HOSPITAL ENCOUNTER (OUTPATIENT)
Age: 63
Setting detail: OUTPATIENT SURGERY
Discharge: HOME | End: 2023-11-28
Attending: INTERNAL MEDICINE | Admitting: INTERNAL MEDICINE
Payer: MEDICARE

## 2023-11-28 ENCOUNTER — ANESTHESIA (OUTPATIENT)
Age: 63
End: 2023-11-28
Payer: MEDICARE

## 2023-11-28 VITALS
TEMPERATURE: 97.3 F | BODY MASS INDEX: 30.99 KG/M2 | OXYGEN SATURATION: 98 % | SYSTOLIC BLOOD PRESSURE: 112 MMHG | DIASTOLIC BLOOD PRESSURE: 67 MMHG | HEART RATE: 63 BPM | WEIGHT: 216 LBS | RESPIRATION RATE: 17 BRPM

## 2023-11-28 DIAGNOSIS — Z12.11 COLON CANCER SCREENING: ICD-10-CM

## 2023-11-28 PROCEDURE — 7100000010 HC PHASE II RECOVERY - FIRST 15 MIN: Performed by: INTERNAL MEDICINE

## 2023-11-28 PROCEDURE — 3600007511: Performed by: INTERNAL MEDICINE

## 2023-11-28 PROCEDURE — 3600007501: Performed by: INTERNAL MEDICINE

## 2023-11-28 PROCEDURE — 3700000001 HC ADD 15 MINUTES (ANESTHESIA): Performed by: INTERNAL MEDICINE

## 2023-11-28 PROCEDURE — 6360000002 HC RX W HCPCS

## 2023-11-28 PROCEDURE — 2709999900 HC NON-CHARGEABLE SUPPLY: Performed by: INTERNAL MEDICINE

## 2023-11-28 PROCEDURE — 3700000000 HC ANESTHESIA ATTENDED CARE: Performed by: INTERNAL MEDICINE

## 2023-11-28 PROCEDURE — 2500000003 HC RX 250 WO HCPCS

## 2023-11-28 PROCEDURE — 7100000011 HC PHASE II RECOVERY - ADDTL 15 MIN: Performed by: INTERNAL MEDICINE

## 2023-11-28 PROCEDURE — 2580000003 HC RX 258: Performed by: INTERNAL MEDICINE

## 2023-11-28 PROCEDURE — G0121 COLON CA SCRN NOT HI RSK IND: HCPCS | Performed by: INTERNAL MEDICINE

## 2023-11-28 RX ORDER — ONDANSETRON 2 MG/ML
4 INJECTION INTRAMUSCULAR; INTRAVENOUS
Status: CANCELLED | OUTPATIENT
Start: 2023-11-28 | End: 2023-11-29

## 2023-11-28 RX ORDER — SODIUM CHLORIDE 0.9 % (FLUSH) 0.9 %
5-40 SYRINGE (ML) INJECTION PRN
Status: CANCELLED | OUTPATIENT
Start: 2023-11-28

## 2023-11-28 RX ORDER — SODIUM CHLORIDE 0.9 % (FLUSH) 0.9 %
5-40 SYRINGE (ML) INJECTION EVERY 12 HOURS SCHEDULED
Status: CANCELLED | OUTPATIENT
Start: 2023-11-28

## 2023-11-28 RX ORDER — SODIUM CHLORIDE 9 MG/ML
INJECTION, SOLUTION INTRAVENOUS PRN
Status: DISCONTINUED | OUTPATIENT
Start: 2023-11-28 | End: 2023-11-28 | Stop reason: HOSPADM

## 2023-11-28 RX ORDER — SODIUM CHLORIDE 9 MG/ML
INJECTION, SOLUTION INTRAVENOUS PRN
Status: CANCELLED | OUTPATIENT
Start: 2023-11-28

## 2023-11-28 RX ORDER — PROPOFOL 10 MG/ML
INJECTION, EMULSION INTRAVENOUS PRN
Status: DISCONTINUED | OUTPATIENT
Start: 2023-11-28 | End: 2023-11-28 | Stop reason: SDUPTHER

## 2023-11-28 RX ORDER — SODIUM CHLORIDE, SODIUM LACTATE, POTASSIUM CHLORIDE, CALCIUM CHLORIDE 600; 310; 30; 20 MG/100ML; MG/100ML; MG/100ML; MG/100ML
INJECTION, SOLUTION INTRAVENOUS CONTINUOUS
Status: DISCONTINUED | OUTPATIENT
Start: 2023-11-28 | End: 2023-11-28 | Stop reason: HOSPADM

## 2023-11-28 RX ORDER — SODIUM CHLORIDE 0.9 % (FLUSH) 0.9 %
5-40 SYRINGE (ML) INJECTION PRN
Status: DISCONTINUED | OUTPATIENT
Start: 2023-11-28 | End: 2023-11-28 | Stop reason: HOSPADM

## 2023-11-28 RX ORDER — SODIUM CHLORIDE 0.9 % (FLUSH) 0.9 %
5-40 SYRINGE (ML) INJECTION EVERY 12 HOURS SCHEDULED
Status: DISCONTINUED | OUTPATIENT
Start: 2023-11-28 | End: 2023-11-28 | Stop reason: HOSPADM

## 2023-11-28 RX ADMIN — PROPOFOL 50 MG: 10 INJECTION, EMULSION INTRAVENOUS at 15:23

## 2023-11-28 RX ADMIN — PROPOFOL 50 MG: 10 INJECTION, EMULSION INTRAVENOUS at 15:27

## 2023-11-28 RX ADMIN — SODIUM CHLORIDE, POTASSIUM CHLORIDE, SODIUM LACTATE AND CALCIUM CHLORIDE: 600; 310; 30; 20 INJECTION, SOLUTION INTRAVENOUS at 15:02

## 2023-11-28 RX ADMIN — LIDOCAINE HYDROCHLORIDE 60 MG: 20 INJECTION, SOLUTION INFILTRATION; PERINEURAL at 15:20

## 2023-11-28 RX ADMIN — PROPOFOL 150 MG: 10 INJECTION, EMULSION INTRAVENOUS at 15:20

## 2023-11-28 ASSESSMENT — PAIN - FUNCTIONAL ASSESSMENT: PAIN_FUNCTIONAL_ASSESSMENT: NONE - DENIES PAIN

## 2023-11-28 NOTE — ANESTHESIA POSTPROCEDURE EVALUATION
Department of Anesthesiology  Postprocedure Note    Patient: Harpreet Bautista  MRN: [id number]  YOB: 1960  Date of evaluation: 11/28/2023      Procedure Summary     Date: 11/28/23 Room / Location: [facility] ENDO 01 / [facility] ENDOSCOPY    Anesthesia Start: 1518 Anesthesia Stop: 1539    Procedure: COLONOSCOPY (Rectum) Diagnosis:       Special screening for malignant neoplasms, colon      (Special screening for malignant neoplasms, colon [Z12.11])    Surgeons: Sandra Gudino MD Responsible Provider: IMER Campos CRNA    Anesthesia Type: MAC ASA Status: 2          Anesthesia Type: MAC    Ramon Phase I:      Ramon Phase II:        Anesthesia Post Evaluation    Patient location during evaluation: PACU  Patient participation: complete - patient participated  Level of consciousness: sleepy but conscious  Pain score: 0  Airway patency: patent  Nausea & Vomiting: no nausea and no vomiting  Complications: no  Cardiovascular status: hemodynamically stable  Respiratory status: acceptable, spontaneous ventilation and room air  Hydration status: stable  Comments: Ok to discharge once criteria met  Multimodal analgesia pain management approach  Pain management: adequate

## 2023-11-28 NOTE — OP NOTE
Colonoscopy procedure note    Date of service: 11/28/2023    Type:  Screening    Indication for procedure: Colon cancer screening    Anesthesia classification: ASA class 2    Patient history and physical been accomplished and documented. Patient is assessed and determined to be appropriate candidate for planned procedure and sedation; patient reassessed immediately prior to sedation. Sedation plan: MAC per anesthesia    Surgical assistant: Not applicable    Airway assessment: Range of motion: Normal, mouth opening, Visual obstruction: No.    UPDATED PREOP EXAM:  Unchanged. VS: Reviewed  Gen: in NAD  CV: RRR, no murmur  Resp: CTA  Abd: Soft, NTND, +BS  Extrem: No cyanosis or edema  Neuro: Awake and alert    Informed consent obtained: Yes. The indications, risks including but not limited to bleeding, perforation, infection, death, and potential failure to visual areas are diagnosed neoplasia, alternatives and benefits were discussed with the patient prior to the procedure. Patient identity and procedure was verified, absent was obtained, and is consistent with the consent form found in the patient's records. PROCEDURE PERFORMED:  COLONOSCOPY  to the cecum with MAC and cannulation of terminal ileum. INSTRUMENT: Olympus colonoscope per nursing notes. FINDINGS:    External anal lesions: Normal   Rectum: normal.   Retroflexion view: Normal  Sigmoid: normal   Descending Colon: normal   Transverse Colon: normal   Ascending Colon: normal   Cecum: normal, including the appendiceal orifice and ileocecal valve. Terminal ileum: normal    Specimens: none     Bowel preparation- adequate to detect small (5mm) polyps or larger. Estimated blood loss: none   Complications:  none   Cecal withdrawal time: 6 minutes. Comments:  none     Impression:  Normal colonoscopy to the cecum. No polyps or masses were seen. Recommendations:  Repeat colonoscopy in 10 years for screening. Follow up as needed.

## 2023-11-28 NOTE — INTERVAL H&P NOTE
Update History & Physical    The patient's History and Physical of November 28, 2023 was reviewed with the patient and I examined the patient. There was no change. The surgical site was confirmed by the patient and me. Plan: The risks, benefits, expected outcome, and alternative to the recommended procedure have been discussed with the patient. Patient understands and wants to proceed with the procedure.      Electronically signed by Eulene Boeck, MD on 11/28/2023 at 3:03 PM

## 2024-05-13 ENCOUNTER — HOSPITAL ENCOUNTER (OUTPATIENT)
Age: 64
Discharge: HOME | End: 2024-05-16

## 2024-05-13 LAB — LABCORP DRAW FEE: NORMAL

## 2024-05-13 PROCEDURE — 99001 SPECIMEN HANDLING PT-LAB: CPT

## 2024-11-15 ENCOUNTER — HOSPITAL ENCOUNTER (OUTPATIENT)
Age: 64
Discharge: HOME | End: 2024-11-18

## 2024-11-15 LAB — LABCORP DRAW FEE: NORMAL

## 2024-11-15 PROCEDURE — 99001 SPECIMEN HANDLING PT-LAB: CPT

## 2025-05-15 ENCOUNTER — HOSPITAL ENCOUNTER (OUTPATIENT)
Age: 65
Discharge: HOME | End: 2025-05-18

## 2025-05-15 LAB — LABCORP DRAW FEE: NORMAL

## 2025-05-15 PROCEDURE — 99001 SPECIMEN HANDLING PT-LAB: CPT

## 2025-06-05 ENCOUNTER — OFFICE VISIT (OUTPATIENT)
Age: 65
End: 2025-06-05
Payer: MEDICARE

## 2025-06-05 VITALS
HEIGHT: 70 IN | TEMPERATURE: 97 F | HEART RATE: 63 BPM | BODY MASS INDEX: 29.78 KG/M2 | SYSTOLIC BLOOD PRESSURE: 113 MMHG | DIASTOLIC BLOOD PRESSURE: 67 MMHG | WEIGHT: 208 LBS | OXYGEN SATURATION: 100 %

## 2025-06-05 DIAGNOSIS — R10.32 LEFT GROIN PAIN: Primary | ICD-10-CM

## 2025-06-05 PROCEDURE — 1124F ACP DISCUSS-NO DSCNMKR DOCD: CPT | Performed by: SURGERY

## 2025-06-05 PROCEDURE — 99204 OFFICE O/P NEW MOD 45 MIN: CPT | Performed by: SURGERY

## 2025-06-05 NOTE — PROGRESS NOTES
Rachael Conway is a 64 y.o. male (: 1960) presenting to address:    Chief Complaint   Patient presents with    New Patient     Left inguinal hernia/referred by Marie York NP with urology       Medication list and allergies have been reviewed with Rachael Conway and updated as of today's date.     I have gone over all Medical, Surgical and Social History with Rachael Conway and updated/added the information accordingly.

## 2025-06-05 NOTE — PROGRESS NOTES
General Surgery Consult      Racahel Conway  Admit date: (Not on file)    MRN: [id number]     : 1960     Age: 64 y.o.        Attending Physician: Angeles Dubose MD, [facility]      History of Present Illness:     Rachael Conway is a 64 y.o. male who was referred to me by the urology team for evaluation of possible left inguinal hernia.  The patient states that he was evaluated for prostate problem and has been told that he has a left inguinal hernia.  He denies any pain or bulge but he noticed that the area on the left side is slightly more swollen than the right.  He denies any previous abdominal surgeries.    Patient Active Problem List    Diagnosis Date Noted    Severe obesity (HCC) 10/29/2018     History reviewed. No pertinent past medical history.   Past Surgical History:   Procedure Laterality Date    PROSTATE BIOPSY        Social History     Tobacco Use    Smoking status: Former     Current packs/day: 0.00     Average packs/day: 1.5 packs/day for 10.3 years (15.4 ttl pk-yrs)     Types: Cigarettes     Start date: 1979     Quit date: 10/29/1989     Years since quittin.6    Smokeless tobacco: Never   Substance Use Topics    Alcohol use: Not Currently      Social History     Tobacco Use   Smoking Status Former    Current packs/day: 0.00    Average packs/day: 1.5 packs/day for 10.3 years (15.4 ttl pk-yrs)    Types: Cigarettes    Start date: 1979    Quit date: 10/29/1989    Years since quittin.6   Smokeless Tobacco Never     Family History   Problem Relation Age of Onset    Hypertension Father     Diabetes Father     Hypertension Mother       No current outpatient medications on file.     No current facility-administered medications for this visit.      No Known Allergies     Review of Systems:  Pertinent items are noted in the History of Present Illness.    Objective:     /67 (BP Site: Left Upper Arm, Patient Position: Sitting, BP Cuff Size: Large Adult)   Pulse 63   Temp 97 °F (36.1

## 2025-06-12 ENCOUNTER — HOSPITAL ENCOUNTER (OUTPATIENT)
Age: 65
Discharge: HOME | End: 2025-06-15
Attending: SURGERY
Payer: MEDICARE

## 2025-06-12 DIAGNOSIS — R10.32 LEFT GROIN PAIN: ICD-10-CM

## 2025-06-12 PROCEDURE — 76870 US EXAM SCROTUM: CPT

## 2025-06-12 PROCEDURE — 76882 US LMTD JT/FCL EVL NVASC XTR: CPT

## 2025-06-20 ENCOUNTER — TELEPHONE (OUTPATIENT)
Age: 65
End: 2025-06-20

## 2025-06-26 ENCOUNTER — OFFICE VISIT (OUTPATIENT)
Age: 65
End: 2025-06-26
Payer: MEDICARE

## 2025-06-26 VITALS
BODY MASS INDEX: 29.49 KG/M2 | DIASTOLIC BLOOD PRESSURE: 77 MMHG | SYSTOLIC BLOOD PRESSURE: 141 MMHG | WEIGHT: 206 LBS | TEMPERATURE: 97.8 F | OXYGEN SATURATION: 100 % | HEIGHT: 70 IN | HEART RATE: 61 BPM

## 2025-06-26 DIAGNOSIS — R10.32 LEFT GROIN PAIN: Primary | ICD-10-CM

## 2025-06-26 PROCEDURE — 1124F ACP DISCUSS-NO DSCNMKR DOCD: CPT | Performed by: SURGERY

## 2025-06-26 PROCEDURE — 99214 OFFICE O/P EST MOD 30 MIN: CPT | Performed by: SURGERY

## 2025-06-26 NOTE — PROGRESS NOTES
General Surgery Consult      Rachael Conway  Admit date: (Not on file)    MRN: [id number]     : 1960     Age: 64 y.o.        Attending Physician: Angeles Dubose MD, [facility]      History of Present Illness:     Rachael Conway is a 64 y.o. male who was referred to me originally for evaluation of left groin pain with possible hernia and when I saw him I did not feel a clear hernia so I ordered a scrotal ultrasound and a limited abdominal ultrasound that showed bilateral hydroceles that were small and right varicocele and small cyst but no evidence of hernias.  The patient stated that he is doing great and he denies any significant pain and the discomfort on the left groin has improved remarkably and it happened very rarely.     Patient Active Problem List    Diagnosis Date Noted    Severe obesity (HCC) 10/29/2018     History reviewed. No pertinent past medical history.   Past Surgical History:   Procedure Laterality Date    PROSTATE BIOPSY        Social History     Tobacco Use    Smoking status: Former     Current packs/day: 0.00     Average packs/day: 1.5 packs/day for 10.3 years (15.4 ttl pk-yrs)     Types: Cigarettes     Start date: 1979     Quit date: 10/29/1989     Years since quittin.6    Smokeless tobacco: Never   Substance Use Topics    Alcohol use: Not Currently      Social History     Tobacco Use   Smoking Status Former    Current packs/day: 0.00    Average packs/day: 1.5 packs/day for 10.3 years (15.4 ttl pk-yrs)    Types: Cigarettes    Start date: 1979    Quit date: 10/29/1989    Years since quittin.6   Smokeless Tobacco Never     Family History   Problem Relation Age of Onset    Hypertension Father     Diabetes Father     Hypertension Mother       No current outpatient medications on file.     No current facility-administered medications for this visit.      No Known Allergies     Review of Systems:  Pertinent items are noted in the History of Present Illness.    Objective:     BP

## 2025-06-26 NOTE — PROGRESS NOTES
Rachael Conway is a 64 y.o. male (: 1960) presenting to address:    Chief Complaint   Patient presents with    New Patient     Discuss US extremity and US scrotum and testicles for left groin pain done on 25       Medication list and allergies have been reviewed with Rachael Conway and updated as of today's date.     I have gone over all Medical, Surgical and Social History with Rachael Conway and updated/added the information accordingly.       1. Have you been to the ER, Urgent Care or Hospitalized since your last visit? No          2. Have you followed up with your PCP or any other Physicians since your procedure/ last office visit?   No

## (undated) DEVICE — TUBING, SUCTION, 9/32" X 10', STRAIGHT: Brand: MEDLINE

## (undated) DEVICE — GLOVE SURG SZ 65 L12IN FNGR THK79MIL GRN LTX FREE

## (undated) DEVICE — SOLUTION IRRIG 1000ML STRL H2O USP PLAS POUR BTL

## (undated) DEVICE — Device: Brand: DEFENDO VALVE AND CONNECTOR KIT

## (undated) DEVICE — ENDOGATOR TUBING FOR BOSTON SCIENTIFIC ENDOSTAT II PUMP, OLYMPUS OFP PUMP OR ENDO STRATUS PUMP: Brand: ENDOGATOR

## (undated) DEVICE — SOLUTION IRRIG 500ML STRL H2O NONPYROGENIC

## (undated) DEVICE — TUBING INSUFFLATION CAP W/ EXT CARBON DIOX ENDO SMARTCAP

## (undated) DEVICE — KIT COLON W/ 1.1OZ LUB AND 2 END